# Patient Record
Sex: FEMALE | Race: WHITE | NOT HISPANIC OR LATINO
[De-identification: names, ages, dates, MRNs, and addresses within clinical notes are randomized per-mention and may not be internally consistent; named-entity substitution may affect disease eponyms.]

---

## 2017-10-16 ENCOUNTER — APPOINTMENT (OUTPATIENT)
Dept: DERMATOLOGY | Facility: CLINIC | Age: 52
End: 2017-10-16
Payer: COMMERCIAL

## 2017-10-16 PROCEDURE — 99213 OFFICE O/P EST LOW 20 MIN: CPT

## 2018-02-06 ENCOUNTER — TRANSCRIPTION ENCOUNTER (OUTPATIENT)
Age: 53
End: 2018-02-06

## 2018-10-15 ENCOUNTER — APPOINTMENT (OUTPATIENT)
Dept: DERMATOLOGY | Facility: CLINIC | Age: 53
End: 2018-10-15
Payer: COMMERCIAL

## 2018-10-15 PROCEDURE — 99214 OFFICE O/P EST MOD 30 MIN: CPT

## 2019-06-21 ENCOUNTER — APPOINTMENT (OUTPATIENT)
Dept: OBGYN | Facility: CLINIC | Age: 54
End: 2019-06-21
Payer: COMMERCIAL

## 2019-07-12 ENCOUNTER — APPOINTMENT (OUTPATIENT)
Dept: OBGYN | Facility: CLINIC | Age: 54
End: 2019-07-12
Payer: COMMERCIAL

## 2019-07-12 VITALS
HEIGHT: 67 IN | DIASTOLIC BLOOD PRESSURE: 75 MMHG | WEIGHT: 150 LBS | HEART RATE: 87 BPM | SYSTOLIC BLOOD PRESSURE: 111 MMHG | BODY MASS INDEX: 23.54 KG/M2

## 2019-07-12 DIAGNOSIS — Z78.9 OTHER SPECIFIED HEALTH STATUS: ICD-10-CM

## 2019-07-12 PROCEDURE — 99214 OFFICE O/P EST MOD 30 MIN: CPT

## 2019-07-12 NOTE — PHYSICAL EXAM
[Awake] : awake [Alert] : alert [Acute Distress] : no acute distress [Mass] : no breast mass [Nipple Discharge] : no nipple discharge [Axillary LAD] : no axillary lymphadenopathy [Soft] : soft [Tender] : non tender [Oriented x3] : oriented to person, place, and time [Atrophy] : atrophy [Normal] : uterus [Cystocele] : no cystocele [No Bleeding] : there was no active vaginal bleeding [Dry Mucosa] : dry mucosa [Discharge] : no discharge [Uterine Adnexae] : were not tender and not enlarged

## 2019-07-12 NOTE — COUNSELING
[Exercise] : exercise [Nutrition] : nutrition [Breast Self Exam] : breast self exam [Vitamins/Supplements] : vitamins/supplements [Safe Sexual Practices] : safe sexual practices [STD (testing, results, tx)] : STD (testing, results, tx)

## 2019-07-12 NOTE — HISTORY OF PRESENT ILLNESS
[Night Sweats] : night sweats [Hot Flashes] : hot flashes [Mood Changes] : no mood changes [Dyspareunia] : no dyspareunia [Vaginal Itching] : no vaginal itching [Headache] : no headache [Fatigue] : no fatigue [2/10] : described as 2/10 in severity [Forgetfulness] : no forgetfulness [Irritability] : no irritability [Depression] : no depression [Loss of Libido] : no loss of libido [Anxiety] : no anxiety [Hot Environment] : exacerbated by hot environment [Stress] : exacerbated by stress [Relaxation] : relieved by relaxation [Cool Environment] : relieved by cool environment

## 2019-07-15 LAB
C TRACH RRNA SPEC QL NAA+PROBE: NOT DETECTED
HPV HIGH+LOW RISK DNA PNL CVX: NOT DETECTED
N GONORRHOEA RRNA SPEC QL NAA+PROBE: NOT DETECTED
SOURCE TP AMPLIFICATION: NORMAL

## 2019-07-17 LAB — CYTOLOGY CVX/VAG DOC THIN PREP: ABNORMAL

## 2019-07-18 ENCOUNTER — FORM ENCOUNTER (OUTPATIENT)
Age: 54
End: 2019-07-18

## 2019-07-19 ENCOUNTER — OUTPATIENT (OUTPATIENT)
Dept: OUTPATIENT SERVICES | Facility: HOSPITAL | Age: 54
LOS: 1 days | End: 2019-07-19
Payer: COMMERCIAL

## 2019-07-19 ENCOUNTER — APPOINTMENT (OUTPATIENT)
Dept: MAMMOGRAPHY | Facility: CLINIC | Age: 54
End: 2019-07-19

## 2019-07-19 DIAGNOSIS — R23.2 FLUSHING: ICD-10-CM

## 2019-07-19 PROCEDURE — 77067 SCR MAMMO BI INCL CAD: CPT | Mod: 26

## 2019-07-19 PROCEDURE — 77063 BREAST TOMOSYNTHESIS BI: CPT | Mod: 26

## 2019-07-19 PROCEDURE — 77067 SCR MAMMO BI INCL CAD: CPT

## 2019-07-19 PROCEDURE — 77063 BREAST TOMOSYNTHESIS BI: CPT

## 2019-10-16 ENCOUNTER — APPOINTMENT (OUTPATIENT)
Dept: DERMATOLOGY | Facility: CLINIC | Age: 54
End: 2019-10-16
Payer: COMMERCIAL

## 2019-10-16 ENCOUNTER — RESULT REVIEW (OUTPATIENT)
Age: 54
End: 2019-10-16

## 2019-10-16 PROCEDURE — 11102 TANGNTL BX SKIN SINGLE LES: CPT | Mod: 59

## 2019-10-16 PROCEDURE — 17110 DESTRUCTION B9 LES UP TO 14: CPT

## 2019-10-16 PROCEDURE — 99213 OFFICE O/P EST LOW 20 MIN: CPT | Mod: 25

## 2020-08-04 ENCOUNTER — APPOINTMENT (OUTPATIENT)
Dept: OBGYN | Facility: CLINIC | Age: 55
End: 2020-08-04
Payer: COMMERCIAL

## 2020-08-04 VITALS
WEIGHT: 151.19 LBS | SYSTOLIC BLOOD PRESSURE: 119 MMHG | BODY MASS INDEX: 23.68 KG/M2 | DIASTOLIC BLOOD PRESSURE: 70 MMHG

## 2020-08-04 DIAGNOSIS — R23.2 FLUSHING: ICD-10-CM

## 2020-08-04 DIAGNOSIS — Z12.39 ENCOUNTER FOR OTHER SCREENING FOR MALIGNANT NEOPLASM OF BREAST: ICD-10-CM

## 2020-08-04 PROCEDURE — 99214 OFFICE O/P EST MOD 30 MIN: CPT

## 2020-08-04 NOTE — PHYSICAL EXAM
[Awake] : awake [Acute Distress] : no acute distress [Alert] : alert [Mass] : no breast mass [Nipple Discharge] : no nipple discharge [Axillary LAD] : no axillary lymphadenopathy [Soft] : soft [Tender] : non tender [Oriented x3] : oriented to person, place, and time [Normal] : uterus [Atrophy] : atrophy [Cystocele] : no cystocele [Discharge] : no discharge [Dry Mucosa] : dry mucosa [No Bleeding] : there was no active vaginal bleeding [Uterine Adnexae] : were not tender and not enlarged

## 2020-08-04 NOTE — HISTORY OF PRESENT ILLNESS
[Night Sweats] : night sweats [Hot Flashes] : hot flashes [Vaginal Itching] : no vaginal itching [Mood Changes] : no mood changes [Dyspareunia] : no dyspareunia [Headache] : no headache [2/10] : described as 2/10 in severity [Weight Change] : no weight change [Fatigue] : no fatigue [Irritability] : no irritability [Forgetfulness] : no forgetfulness [Loss of Libido] : no loss of libido [Depression] : no depression [Stress] : exacerbated by stress [Hot Environment] : exacerbated by hot environment [Anxiety] : no anxiety [Cool Environment] : relieved by cool environment

## 2020-08-10 LAB — CYTOLOGY CVX/VAG DOC THIN PREP: ABNORMAL

## 2020-09-04 ENCOUNTER — TRANSCRIPTION ENCOUNTER (OUTPATIENT)
Age: 55
End: 2020-09-04

## 2020-09-08 DIAGNOSIS — N94.9 UNSPECIFIED CONDITION ASSOCIATED WITH FEMALE GENITAL ORGANS AND MENSTRUAL CYCLE: ICD-10-CM

## 2020-09-15 ENCOUNTER — APPOINTMENT (OUTPATIENT)
Dept: OBGYN | Facility: CLINIC | Age: 55
End: 2020-09-15

## 2020-10-14 ENCOUNTER — APPOINTMENT (OUTPATIENT)
Dept: DERMATOLOGY | Facility: CLINIC | Age: 55
End: 2020-10-14
Payer: COMMERCIAL

## 2020-10-14 PROCEDURE — 99214 OFFICE O/P EST MOD 30 MIN: CPT

## 2020-10-14 PROCEDURE — D0125: CPT

## 2020-10-17 ENCOUNTER — RESULT REVIEW (OUTPATIENT)
Age: 55
End: 2020-10-17

## 2020-10-17 ENCOUNTER — OUTPATIENT (OUTPATIENT)
Dept: OUTPATIENT SERVICES | Facility: HOSPITAL | Age: 55
LOS: 1 days | End: 2020-10-17
Payer: COMMERCIAL

## 2020-10-17 ENCOUNTER — APPOINTMENT (OUTPATIENT)
Dept: MAMMOGRAPHY | Facility: CLINIC | Age: 55
End: 2020-10-17
Payer: COMMERCIAL

## 2020-10-17 DIAGNOSIS — Z00.00 ENCOUNTER FOR GENERAL ADULT MEDICAL EXAMINATION WITHOUT ABNORMAL FINDINGS: ICD-10-CM

## 2020-10-17 PROCEDURE — 77067 SCR MAMMO BI INCL CAD: CPT

## 2020-10-17 PROCEDURE — 77063 BREAST TOMOSYNTHESIS BI: CPT | Mod: 26

## 2020-10-17 PROCEDURE — 77063 BREAST TOMOSYNTHESIS BI: CPT

## 2020-10-17 PROCEDURE — 77067 SCR MAMMO BI INCL CAD: CPT | Mod: 26

## 2021-10-13 ENCOUNTER — APPOINTMENT (OUTPATIENT)
Dept: DERMATOLOGY | Facility: CLINIC | Age: 56
End: 2021-10-13
Payer: COMMERCIAL

## 2021-10-13 PROCEDURE — 99213 OFFICE O/P EST LOW 20 MIN: CPT

## 2021-11-15 ENCOUNTER — APPOINTMENT (OUTPATIENT)
Dept: OBGYN | Facility: CLINIC | Age: 56
End: 2021-11-15
Payer: COMMERCIAL

## 2021-11-15 VITALS
HEIGHT: 67 IN | SYSTOLIC BLOOD PRESSURE: 126 MMHG | BODY MASS INDEX: 25.11 KG/M2 | WEIGHT: 160 LBS | DIASTOLIC BLOOD PRESSURE: 74 MMHG

## 2021-11-15 PROCEDURE — 99396 PREV VISIT EST AGE 40-64: CPT

## 2021-11-15 NOTE — PHYSICAL EXAM
[Chaperone Present] : A chaperone was present in the examining room during all aspects of the physical examination [FreeTextEntry1] : Billie [Appropriately responsive] : appropriately responsive [Alert] : alert [No Acute Distress] : no acute distress [No Lymphadenopathy] : no lymphadenopathy [Regular Rate Rhythm] : regular rate rhythm [No Murmurs] : no murmurs [Clear to Auscultation B/L] : clear to auscultation bilaterally [Soft] : soft [Non-tender] : non-tender [Non-distended] : non-distended [No HSM] : No HSM [No Lesions] : no lesions [No Mass] : no mass [Oriented x3] : oriented x3 [Examination Of The Breasts] : a normal appearance [No Masses] : no breast masses were palpable [Vulvar Atrophy] : vulvar atrophy [Labia Majora] : normal [Labia Minora] : normal [Atrophy] : atrophy [Dry Mucosa] : dry mucosa [Normal] : normal [Uterine Adnexae] : normal

## 2021-11-15 NOTE — HISTORY OF PRESENT ILLNESS
[Y] : Positive pregnancy history [Menarche Age: ____] : age at menarche was [unfilled] [Menopause Age: ____] : age at menopause was [unfilled] [PGHxTotal] : 2 [[facility] : [facility] [PGHxPremature] : 0 [PGHxAbortions] : 0 [[facility] [PGHxABSpont] : 0 [PGHxEctopic] : 0 [PGHxMultBirths] : 0

## 2021-11-19 LAB — CYTOLOGY CVX/VAG DOC THIN PREP: ABNORMAL

## 2021-12-11 ENCOUNTER — RESULT REVIEW (OUTPATIENT)
Age: 56
End: 2021-12-11

## 2021-12-11 ENCOUNTER — OUTPATIENT (OUTPATIENT)
Dept: OUTPATIENT SERVICES | Facility: HOSPITAL | Age: 56
LOS: 1 days | End: 2021-12-11
Payer: COMMERCIAL

## 2021-12-11 ENCOUNTER — APPOINTMENT (OUTPATIENT)
Dept: MAMMOGRAPHY | Facility: CLINIC | Age: 56
End: 2021-12-11
Payer: COMMERCIAL

## 2021-12-11 DIAGNOSIS — Z12.31 ENCOUNTER FOR SCREENING MAMMOGRAM FOR MALIGNANT NEOPLASM OF BREAST: ICD-10-CM

## 2021-12-11 PROCEDURE — 77067 SCR MAMMO BI INCL CAD: CPT

## 2021-12-11 PROCEDURE — 77063 BREAST TOMOSYNTHESIS BI: CPT

## 2021-12-11 PROCEDURE — 77067 SCR MAMMO BI INCL CAD: CPT | Mod: 26

## 2021-12-11 PROCEDURE — 77063 BREAST TOMOSYNTHESIS BI: CPT | Mod: 26

## 2023-04-23 ENCOUNTER — NON-APPOINTMENT (OUTPATIENT)
Age: 58
End: 2023-04-23

## 2023-04-24 ENCOUNTER — APPOINTMENT (OUTPATIENT)
Dept: DERMATOLOGY | Facility: CLINIC | Age: 58
End: 2023-04-24
Payer: COMMERCIAL

## 2023-04-24 PROCEDURE — 99213 OFFICE O/P EST LOW 20 MIN: CPT

## 2023-07-19 ENCOUNTER — APPOINTMENT (OUTPATIENT)
Dept: OBGYN | Facility: CLINIC | Age: 58
End: 2023-07-19
Payer: COMMERCIAL

## 2023-07-19 VITALS
DIASTOLIC BLOOD PRESSURE: 78 MMHG | WEIGHT: 159.1 LBS | HEIGHT: 67 IN | BODY MASS INDEX: 24.97 KG/M2 | SYSTOLIC BLOOD PRESSURE: 120 MMHG

## 2023-07-19 DIAGNOSIS — N90.5 ATROPHY OF VULVA: ICD-10-CM

## 2023-07-19 DIAGNOSIS — N95.2 POSTMENOPAUSAL ATROPHIC VAGINITIS: ICD-10-CM

## 2023-07-19 PROCEDURE — 99396 PREV VISIT EST AGE 40-64: CPT

## 2023-07-19 NOTE — HISTORY OF PRESENT ILLNESS
[N] : Patient does not use contraception [Y] : Positive pregnancy history [Menarche Age: ____] : age at menarche was [unfilled] [Menopause Age: ____] : age at menopause was [unfilled] [PGHxTotal] : 2 [[facility] [PGHxPremature] : 0 [PGHxAbortions] : 0 [[facility]] : [facility] [PGHxABInduced] : 0 [PGHxABSpont] : 0 [PGHxEctopic] : 0 [PGHxMultBirths] : 0

## 2023-07-19 NOTE — HISTORY OF PRESENT ILLNESS
[N] : Patient does not use contraception [Y] : Positive pregnancy history [Menarche Age: ____] : age at menarche was [unfilled] [Menopause Age: ____] : age at menopause was [unfilled] [PGHxTotal] : 2 [[facility] : [facility] [PGHxPremature] : 0 [PGHxAbortions] : 0 [[facility]] : 2 [PGHxABInduced] : 0 [PGHxABSpont] : 0 [PGHxEctopic] : 0 [PGHxMultBirths] : 0

## 2023-07-24 LAB — CYTOLOGY CVX/VAG DOC THIN PREP: ABNORMAL

## 2023-10-27 ENCOUNTER — APPOINTMENT (OUTPATIENT)
Dept: DERMATOLOGY | Facility: CLINIC | Age: 58
End: 2023-10-27
Payer: COMMERCIAL

## 2023-10-27 PROCEDURE — 99212 OFFICE O/P EST SF 10 MIN: CPT

## 2023-12-12 ENCOUNTER — APPOINTMENT (OUTPATIENT)
Dept: MAMMOGRAPHY | Facility: CLINIC | Age: 58
End: 2023-12-12
Payer: COMMERCIAL

## 2023-12-12 ENCOUNTER — OUTPATIENT (OUTPATIENT)
Dept: OUTPATIENT SERVICES | Facility: HOSPITAL | Age: 58
LOS: 1 days | End: 2023-12-12
Payer: COMMERCIAL

## 2023-12-12 ENCOUNTER — RESULT REVIEW (OUTPATIENT)
Age: 58
End: 2023-12-12

## 2023-12-12 DIAGNOSIS — Z12.39 ENCOUNTER FOR OTHER SCREENING FOR MALIGNANT NEOPLASM OF BREAST: ICD-10-CM

## 2023-12-12 PROCEDURE — 77063 BREAST TOMOSYNTHESIS BI: CPT | Mod: 26

## 2023-12-12 PROCEDURE — 77067 SCR MAMMO BI INCL CAD: CPT

## 2023-12-12 PROCEDURE — 77067 SCR MAMMO BI INCL CAD: CPT | Mod: 26

## 2023-12-12 PROCEDURE — 77063 BREAST TOMOSYNTHESIS BI: CPT

## 2024-05-28 ENCOUNTER — APPOINTMENT (OUTPATIENT)
Dept: DERMATOLOGY | Facility: CLINIC | Age: 59
End: 2024-05-28
Payer: COMMERCIAL

## 2024-05-28 PROCEDURE — 99213 OFFICE O/P EST LOW 20 MIN: CPT

## 2024-07-09 ENCOUNTER — NON-APPOINTMENT (OUTPATIENT)
Age: 59
End: 2024-07-09

## 2024-07-29 ENCOUNTER — APPOINTMENT (OUTPATIENT)
Dept: OBGYN | Facility: CLINIC | Age: 59
End: 2024-07-29
Payer: COMMERCIAL

## 2024-07-29 VITALS
DIASTOLIC BLOOD PRESSURE: 60 MMHG | WEIGHT: 152.56 LBS | BODY MASS INDEX: 23.94 KG/M2 | HEIGHT: 67 IN | SYSTOLIC BLOOD PRESSURE: 120 MMHG

## 2024-07-29 DIAGNOSIS — N95.2 POSTMENOPAUSAL ATROPHIC VAGINITIS: ICD-10-CM

## 2024-07-29 PROCEDURE — 99214 OFFICE O/P EST MOD 30 MIN: CPT | Mod: 25

## 2024-07-29 PROCEDURE — 99459 PELVIC EXAMINATION: CPT

## 2024-07-29 PROCEDURE — 99396 PREV VISIT EST AGE 40-64: CPT

## 2024-07-29 NOTE — HISTORY OF PRESENT ILLNESS
[FreeTextEntry1] : 59-year-old -0-0-2 presents for GYN evaluation.  Denies pain bleeding or discharge.  No bleeding.

## 2024-07-29 NOTE — DISCUSSION/SUMMARY
[FreeTextEntry1] : 59-year-old G2, P2 presents for GYN evaluation.  Denies pain bleeding or discharge.  Physical examination shows atrophic vulva vagina.  Pap GC chlamydia sent, and mammogram ordered. Exercise and dieting discussed.. Sexual health discussed. Bone density ordered.  Return in 1 year for follow-up.

## 2024-07-29 NOTE — PHYSICAL EXAM
[Chaperone Present] : A chaperone was present in the examining room during all aspects of the physical examination [[id number]] : A chaperone was present during the pelvic exam. [FreeTextEntry2] : lm [Appropriately responsive] : appropriately responsive [Alert] : alert [No Acute Distress] : no acute distress [No Lymphadenopathy] : no lymphadenopathy [Regular Rate Rhythm] : regular rate rhythm [No Murmurs] : no murmurs [Clear to Auscultation B/L] : clear to auscultation bilaterally [Soft] : soft [Non-tender] : non-tender [Non-distended] : non-distended [No HSM] : No HSM [No Lesions] : no lesions [No Mass] : no mass [Oriented x3] : oriented x3 [Examination Of The Breasts] : a normal appearance [No Masses] : no breast masses were palpable [Vulvar Atrophy] : vulvar atrophy [Labia Majora] : normal [Labia Minora] : normal [Atrophy] : atrophy [Dry Mucosa] : dry mucosa [Normal] : normal [Uterine Adnexae] : normal

## 2024-08-05 LAB — CYTOLOGY CVX/VAG DOC THIN PREP: ABNORMAL

## 2024-11-04 ENCOUNTER — NON-APPOINTMENT (OUTPATIENT)
Age: 59
End: 2024-11-04

## 2025-01-30 ENCOUNTER — APPOINTMENT (OUTPATIENT)
Dept: DERMATOLOGY | Facility: CLINIC | Age: 60
End: 2025-01-30
Payer: COMMERCIAL

## 2025-01-30 PROCEDURE — 99213 OFFICE O/P EST LOW 20 MIN: CPT
